# Patient Record
Sex: MALE | Race: WHITE | NOT HISPANIC OR LATINO | Employment: STUDENT | URBAN - METROPOLITAN AREA
[De-identification: names, ages, dates, MRNs, and addresses within clinical notes are randomized per-mention and may not be internally consistent; named-entity substitution may affect disease eponyms.]

---
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
## 2022-02-11 VITALS
WEIGHT: 165.4 LBS | BODY MASS INDEX: 23.15 KG/M2 | TEMPERATURE: 97.5 F | SYSTOLIC BLOOD PRESSURE: 116 MMHG | SYSTOLIC BLOOD PRESSURE: 108 MMHG | TEMPERATURE: 98.6 F | DIASTOLIC BLOOD PRESSURE: 64 MMHG | SYSTOLIC BLOOD PRESSURE: 114 MMHG | HEART RATE: 80 BPM | HEART RATE: 68 BPM | OXYGEN SATURATION: 98 % | DIASTOLIC BLOOD PRESSURE: 70 MMHG | HEIGHT: 71 IN | TEMPERATURE: 98 F | SYSTOLIC BLOOD PRESSURE: 112 MMHG | WEIGHT: 170 LBS | DIASTOLIC BLOOD PRESSURE: 62 MMHG | HEART RATE: 72 BPM | DIASTOLIC BLOOD PRESSURE: 62 MMHG | OXYGEN SATURATION: 98 % | HEART RATE: 66 BPM | WEIGHT: 171.2 LBS | WEIGHT: 171.2 LBS

## 2022-02-11 VITALS
DIASTOLIC BLOOD PRESSURE: 70 MMHG | TEMPERATURE: 97.5 F | TEMPERATURE: 97.5 F | WEIGHT: 171.8 LBS | HEART RATE: 68 BPM | DIASTOLIC BLOOD PRESSURE: 74 MMHG | WEIGHT: 173.6 LBS | SYSTOLIC BLOOD PRESSURE: 122 MMHG | SYSTOLIC BLOOD PRESSURE: 114 MMHG | HEART RATE: 68 BPM

## 2022-02-11 VITALS
HEIGHT: 74 IN | WEIGHT: 190 LBS | SYSTOLIC BLOOD PRESSURE: 118 MMHG | HEART RATE: 70 BPM | DIASTOLIC BLOOD PRESSURE: 62 MMHG | BODY MASS INDEX: 24.38 KG/M2

## 2022-02-11 VITALS
SYSTOLIC BLOOD PRESSURE: 118 MMHG | HEIGHT: 71 IN | DIASTOLIC BLOOD PRESSURE: 72 MMHG | WEIGHT: 165.8 LBS | HEART RATE: 60 BPM | BODY MASS INDEX: 23.21 KG/M2 | TEMPERATURE: 98.4 F

## 2022-02-11 VITALS
BODY MASS INDEX: 24.9 KG/M2 | WEIGHT: 194 LBS | SYSTOLIC BLOOD PRESSURE: 118 MMHG | TEMPERATURE: 97.6 F | HEIGHT: 74 IN | DIASTOLIC BLOOD PRESSURE: 74 MMHG | HEART RATE: 80 BPM

## 2022-02-15 NOTE — TELEPHONE ENCOUNTER
---------------------  From: Richard Toledo LPN ([employer] Message Pool (32224_[facility]))   To: MAKI MCCULLOUGH    Sent: 10/8/2021 8:29:19 AM CDT  Subject: RE: Consumer message: FW: UWRF Physical     Good morning Maki, I learned this morning that it was sent through the mail on the 5th.  I would imagine it will be to you by Monday.        ---------------------  From: MAKI MCCULLOUGH  To: [employer] Message Pool (32224_[facility])  Sent: 10/07/2021 03:18 p.m. CDT  Subject: RE: Consumer message: FW: UWRF Physical  Has it already been mailed out? Otherwise I can pick it up tomorrow.  ---------------------  From: Richard Toledo LPN ([employer] Message Pool (32224_[facility]))  To: MAKI MCCULLOUGH  Sent: 10/7/2021 3:01:16 PM CDT  Subject: RE: Consumer message: FW: UWRF Physical       Tamar Guevara, I honestly do not know the local mail turn around times, but I do know that   is the fastest (if you live locally) and most secure way to get your form.  No chance it will get lost in the mail.  If you need it sooner than later that is better option.  Please respond with what you prefer.            ---------------------  From: MAKI MCCULLOUGH  To: [employer] Message Pool (32224_[facility])  Sent: 10/07/2021 01:34 p.m. CDT  Subject: RE: Consumer message: FW: UWRF Physical  When can I expect that to arrive so you think?       Addendum by Rozina Roldan on October 05, 2021 10:45:47 AM CDT  ---------------------  From: Rozina Roldan ([employer] Message Pool (32224_[facility]))  To: MAKI MCCULLOUGH  Sent: 10/5/2021 10:45:47 AM CDT  Subject: RE: Consumer message: FW: [facility] Physical       Hi Maki,       I have the [facility] sports exam form filled out and signed by Dr. Robles. Would you like me to mail it to you or have it at the  for ?       Thanks,       Rozina TACOS       Addendum by Travis JESUS [place] on October 05, 2021 7:26:22 AM CDT  ---------------------  From: Travis JESUS,  Minesh  To: T Message Pool ([id number]);  Sent: 10/5/2021 7:26:22 AM CDT  Subject: RE: Consumer message: FW: UWRF Physical       OK with me.  Please fill out and I will sign.  ---------------------  From: Rozina Roldan ([facility] Message Pool ([address] [place] [address]))  To: Minesh Robles MD;  Sent: 10/5/2021 7:03:58 AM CDT  Subject: FW: Consumer message: FW: UWRF Physical       If okay to fill out again, form is attached in original message in chart.            ---------------------  From: MAKI MCCULLOUGH  To: [facility] Message Pool ([facility])  Sent: 09/29/2021 12:55 p.m. CDT  Subject: RE: Consumer message: FW: UWRF Physical  Yes. That should be fine. Should I reach out again or will you keep this request?       Addendum by Mireya Balbuena CMA on September 29, 2021 9:52:06 AM CDT  ---------------------  From: Mireya Balbuena CMA ([facility] Message Pool ([address] [place] [address]))  To: Che Springer RN; MAKI MCCULLOUGH  Sent: 9/29/2021 9:52:06 AM CDT  Subject: RE: Consumer message: FW: UWRF Physical       Dr Travis Elena is out of clinic until Tuesday October 5th. Can you wait until that time for your paperwork to be addressed?       Mireya SIERRA CMA  ---------------------  From: Che Springer RN (Phone Messages Pool ([address] [place] [address]))  To: T Message Pool ([address] [place] [address]);  Sent: 9/29/2021 7:59:59 AM CDT  Subject: Consumer message: FW: UWRF Physical                      ---------------------  From: MAKI MCCULLOUGH  To: [facility]  Sent: 09/28/2021 08:10 p.m. CDT  Subject: [facility] Physical  Hello,  Would you please fill out this form again for m?  You had done one already for the [facility].  I did a pivot and am now at [facility].  Please find the form attached.  Thank you so much,  Maki

## 2022-02-15 NOTE — PROGRESS NOTES
Patient:   MAKI MCCULLOUGH            MRN: [id number]            FIN: [id number]               Age:   18 years     Sex:  Male     :  2000   Associated Diagnoses:   Left ankle sprain   Author:   Isma Gonzalez PA-C      Report Summary   Diagnosis  Left ankle sprain (WXA49-IB S93.402A).  Patient Instructions   Visit Information   Visit type:  General concerns.    Accompanied by:  Father.    Source of history:  Self, Father, Medical record.    History limitation:  None.       Chief Complaint   2019 8:54 AM CDT    Left ankle injury; hurt last night during basketball game      History of Present Illness             The patient presents with pain and injury.  The symptom(s) is described as pain, swelling, a limited range of motion and no deformity.  The location of symptom(s) is the left and ankle.  The severity of the symptom(s) is moderate.  The timing/course of symptom(s) is constant.  The symptom(s) has lasted for 1 day(s).  Radiation of pain: none.  The context of the symptom(s): occurred during sports.  Inversion injury last night to left ankle..        Review of Systems   Constitutional:  Negative.    Musculoskeletal:  Negative except as documented in history of present illness.    Integumentary:  Negative.    Neurologic:  Negative.       Health Status   Allergies:    Allergic Reactions (All)  No Known Medication Allergies  Canceled/Inactive Reactions (All)  No known allergies      Histories   Past Medical History:    No active or resolved past medical history items have been selected or recorded.   Family History:    No family history items have been selected or recorded.   Procedure history:    Tonsillectomy with adenoidectomy (89604596) in  at 9 Years.   Social History:             No active social history items have been recorded.      Physical Examination   Vital Signs   2019 8:54 AM CDT Peripheral Pulse Rate 70 bpm    HR Method Manual    Systolic Blood Pressure 118 mmHg    Diastolic  Blood Pressure 62 mmHg    Mean Arterial Pressure 81 mmHg    BP Method Manual      Measurements from flowsheet : Measurements   7/17/2019 8:54 AM CDT Height Measured - Standard 73.5 in    Weight Measured - Standard 190.0 lb    BSA 2.11 m2    Body Mass Index 24.72 kg/m2    Body Mass Index Percentile 84.78      General:  Alert and oriented, No acute distress.    Cardiovascular:          Arterial pulses: Left, Dorsalis pedis, 2+.         Capillary refill: Left, Lower extremity, Within normal limits.    Musculoskeletal:  Normal range of motion, No deformity, Mild soft tissue swelling noted diffusely about the ankle. .    Integumentary:  No rash.    Neurologic:  No focal deficits.       Review / Management   Radiology results   Appears normal to my read, waiting for official read.  Will contact patient with any other findings.      Impression and Plan   Diagnosis     Left ankle sprain (YQI99-UJ S93.402A).     Patient Instructions:       Counseled: Patient, Family, Regarding diagnosis, Regarding medications, Activity, Verbalized understanding.    Elevate, ACE. NSAIDS. Ice. FU in 7-10 days if not improved.

## 2022-02-15 NOTE — NURSING NOTE
Comprehensive Intake Entered On:  2021 8:49 AM CDT    Performed On:  2021 8:44 AM CDT by Alyssa Sawyer LPN               Summary   Chief Complaint :   physical for collage sports.    Menstrual Status :   N/A   Weight Measured :   194 lb(Converted to: 194 lb 0 oz, 87.997 kg)    Height Measured :   73.5 in(Converted to: 6 ft 1 in, 186.69 cm)    Body Mass Index :   25.25 kg/m2   Body Surface Area :   2.13 m2   Systolic Blood Pressure :   118 mmHg   Diastolic Blood Pressure :   74 mmHg   Mean Arterial Pressure :   89 mmHg   Peripheral Pulse Rate :   80 bpm   BP Site :   Right arm   Pulse Site :   Radial artery   BP Method :   Manual   HR Method :   Manual   Temperature Tympanic :   97.6 DegF(Converted to: 36.4 DegC)  (LOW)    Alyssa Sawyer LPN - 2021 8:44 AM CDT   Demographics   Last Name :   Jordon   Address :   [address]   First Name :   Ismael   Davon Initial :   HOLA POSADAS   Responsible Party Date of Birth () :   2000 CDT   City :   [place]   State :   [place]   Zip Code :   [id number]   Alyssa Sawyer LPN - 2021 8:44 AM CDT   Consents   Consent for Immunization Exchange :   Consent Granted   Consent for Immunizations to Providers :   Consent Granted   Alyssa Sawyer LPN - 2021 8:44 AM CDT   Meds / Allergies   (As Of: 2021 8:49:41 AM CDT)   Allergies (Active)   No Known Medication Allergies  Estimated Onset Date:   Unspecified ; Created By:   Melanie Angulo CMA; Reaction Status:   Active ; Category:   Drug ; Substance:   No Known Medication Allergies ; Type:   Allergy ; Updated By:   Melanie Angulo CMA; Reviewed Date:   2021 8:47 AM CDT        Medication List   (As Of: 2021 8:49:41 AM CDT)        Depression Screening   Little Interest - Pleasure in Activities :   Not at all   Feeling Down, Depressed, Hopeless :   Not at all   Initial Depression Screen Score :   0 Score   Poor Appetite or Overeating :   Not at all   Trouble Falling or Staying Asleep :   Not at  all   Feeling Tired or Little Energy :   Not at all   Feeling Bad About Yourself :   Not at all   Trouble Concentrating :   Not at all   Moving or Speaking Slowly :   Not at all   Thoughts Better Off Dead or Hurting Self :   Not at all   Difficulty at Work, Home, Getting Along :   Not difficult at all   Detailed Depression Screen Score :   0    Total Depression Screen Score :   0    Alyssa Sawyer LPN - 6/29/2021 8:44 AM CDT   Past Medical History   Past Medical History   (As Of: 6/29/2021 8:49:41 AM CDT)     Social History   Social History   (As Of: 6/29/2021 8:49:41 AM CDT)   Tobacco:        Never (less than 100 in lifetime)   (Last Updated: 6/29/2021 8:47:11 AM CDT by Alyssa Sawyer LPN)          Electronic Cigarette/Vaping:        Electronic Cigarette Use: Never.   (Last Updated: 6/29/2021 8:47:15 AM CDT by Alyssa Sawyer LPN)

## 2022-02-15 NOTE — TELEPHONE ENCOUNTER
---------------------From: Nguyen Zimmerman RN (Phone Messages Pool (78224_[facility])) To: [facility] Message Pool ([id number]);   Sent: 7/2/2021 8:29:41 AM CDTSubject: FW: College Physical Form ---------------------From: MAKI MCCULLOUGHTo: [facility] CenterSent: 07/02/2021 08:28 a.m. CDTSubject: College Physical FormHi Dr. Robles,Attached is the form that I need you to fill out for me.Thank you,Maki McculloughForm printed an on your desk. Pt seen for sports px 6/29/2021.---------------------From: Susan Ivey ([employer] Message Pool (32224_[facility])) To: Minesh Robles MD;   Sent: 7/2/2021 10:12:35 AM CDTSubject: FW: College Physical Form---------------------From: Minesh Robles MD To: [facility] Message Pool (22024_[facility]);   Sent: 7/2/2021 10:23:06 AM CDTSubject: RE: College Physical Form Done.Left message to call back. Please tell him form is completed. Does he want to pick it up or does he want it mailed to him?PT CALLED BACK AND WE CAN MAIL IT TO THE ADDRESS ON FILE.Mailed.

## 2022-02-15 NOTE — PROGRESS NOTES
Patient:   MAKI MCCULLOUGH            MRN: [id number]            FIN: [id number]               Age:   16 years     Sex:  Male     :  2000   Associated Diagnoses:   Fracture of distal fibula   Author:   Minesh Robles MD      Chief Complaint   2017 2:54 PM CDT     f/u ankle fracture     Chief complaint and symptoms noted above confirmed with patient.      History of Present Illness             The patient presents with ankle fracture.  The location of the ankle fracture is the right.  The ankle fracture is characterized by pain and swelling.  The severity of the injury is moderate.  The timing/course of symptoms associated to the ankle fracture is constant.  The ankle fracture occurred 5 week(s) ago.  Additional injury (injuries): basketball.  Associated symptoms consist of none.  Prior treatment consists of immobilization.  Reviewed Xrays.        Review of Systems   Constitutional:  Negative.    Musculoskeletal:  Negative except as documented in history of present illness.       Health Status   Allergies:    Allergic Reactions (Selected)  No known allergies      Histories   Past Medical History:    No active or resolved past medical history items have been selected or recorded.   Family History:    No family history items have been selected or recorded.   Procedure history:    Tonsillectomy with adenoidectomy (SNOMED CT 25347548) in  at 9 Years.      Physical Examination   Vital Signs   2017 2:54 PM CDT Temperature Tympanic 97.5 DegF  LOW    Peripheral Pulse Rate 68 bpm    Pulse Site Radial artery    HR Method Manual    Systolic Blood Pressure 114 mmHg    Diastolic Blood Pressure 70 mmHg    Mean Arterial Pressure 85 mmHg    BP Site Left arm    BP Method Manual      Measurements from flowsheet : Measurements   2017 2:54 PM CDT     Weight Measured - Standard                171.8 lb     General:  Alert and oriented, No acute distress.    Musculoskeletal:       Lower extremity exam: Ankle  ( Right, No tenderness, Normal range of motion ).    Integumentary:  Warm, Dry, Pink.       Review / Management   Radiology results   X-ray, Resolving fracture distal fibula      Impression and Plan   Diagnosis     Fracture of distal fibula (SCD23-NE S82.839A).     Course:  Progressing as expected.    Orders     Will transition to stirrup splint.  Can work out but no practice or games for 2 weeks..

## 2022-02-15 NOTE — PROGRESS NOTES
Patient:   MAKI MCCULLOUGH            MRN: [id number]            FIN: [id number]               Age:   16 years     Sex:  Male     :  2000   Associated Diagnoses:   Fracture of distal fibula   Author:   Minesh Robles MD      Chief Complaint   2017 12:55 PM CDT    R ankle pain post injury 17.  Swelling and bruised     Chief complaint and symptoms noted above confirmed with patient.      History of Present Illness             The patient presents with ankle fracture.  The location of the ankle fracture is the right.  The ankle fracture is characterized by pain and swelling.  The severity of the injury is moderate.  The timing/course of symptoms associated to the ankle fracture is constant.  Additional injury (injuries): basketball.  Prior treatment consists of none.        Review of Systems   Constitutional:  Negative.    Musculoskeletal:  Negative except as documented in history of present illness.       Health Status   Allergies:    Allergic Reactions (Selected)  No known allergies      Histories   Past Medical History:    No active or resolved past medical history items have been selected or recorded.   Family History:    No family history items have been selected or recorded.   Procedure history:    Tonsillectomy with adenoidectomy (SNOMED CT 03489719) in  at 9 Years.      Physical Examination   Vital Signs   2017 12:55 PM CDT Temperature Tympanic 98.6 DegF    Peripheral Pulse Rate 80 bpm    Pulse Site Radial artery    HR Method Manual    Systolic Blood Pressure 112 mmHg    Diastolic Blood Pressure 62 mmHg    Mean Arterial Pressure 79 mmHg    BP Site Left arm    BP Method Manual      Measurements from flowsheet : Measurements   2017 12:55 PM CDT    Weight Measured - Standard                170 lb     General:  Alert and oriented, No acute distress.    Musculoskeletal:       Lower extremity exam: Ankle ( Right, Ecchymosis, Swelling ).    Integumentary:  Warm, Dry, Pink.        Review / Management   Radiology results   X-ray, ? fx distal fibula      Impression and Plan   Diagnosis     Fracture of distal fibula (CEW04-JC S82.839A).     Course:  Progressing as expected.    Orders     Cam walker x 4-6 weeks.  F/U if not improving..

## 2022-02-15 NOTE — TELEPHONE ENCOUNTER
---------------------From: Martha Michel LPN (Phone Messages Pool ([id number])) To: [facility] Message Pool ([address] [place] [address]);   Sent: 7/9/2021 2:03:30 PM CDTSubject: CONSUMER MESSAGE FW: Can'tWait Physical ---------------------From: MAKI MCCULLOUGHTo: [facility] CenterSent: 07/09/2021 01:51 p.m. CDTSubject: [place] PhysicalHelcarol ann,I was in the other day and needed  to fill out a form for college for me.  He did and I received it back, but I need some additional labs for finding...I need:Urine - specific gravityProteinGlucoseVision Right and LeftPlease see under the Physician Examinationand advise how to take care of this.  Thank you, Maki---------------------From: Mireya Balbuena CMA ([employer] Message Pool (32224[facility])) To: Minesh Robles MD;   Sent: 7/9/2021 2:12:22 PM CDTSubject: FW: CONSUMER MESSAGE FW: [place] Physical Advise on lab orders. Will have him come in for nurse only vision exam.---------------------From: Minesh Robles MD To: [employer] Message Pool (26624[facility]); MAKI MCCULLOUGH  Sent: 7/9/2021 3:16:25 PM CDTSubject: RE: CONSUMER MESSAGE FW: [place] Physical Jonnathan Guevara, you can make a nurse only visit and have them get a U/A and visual acuity.  I can easily add them to your form for school as they can all be done same day.---------------------From: Minesh Robles MD To: [employer] Message Pool (40024[facility]);   Sent: 7/9/2021 3:17:32 PM CDTSubject: RE: CONSUMER MESSAGE FW: College Physical I think thee should have been done at his physical and I would like to see that he doesn't incur another visit charge but only for the U/A minor.[facility]---------------------From: Mireya Balbuena CMA (T Message Pool (32224_[facility])) To: Unit 2 Pool (32224_[facility]) ;   Sent: 7/9/2021 4:46:16 PM CDTSubject: FW: CONSUMER MESSAGE FW: College Physical FYI. Form will need to be obtained during nurse only visit and given to [facility] for addendum. Can mail to  patient when completed.---------------------From: Jimena Khan CMA (Unit 2 Pool (32224_[facility]) ) To: [facility] Message Pool (32224_[facility]);   Sent: 7/22/2021 2:05:29 PM CDTSubject: FW: CONSUMER MESSAGE FW: College Physical Pt in for appt..... eye exam documented on form... form given to Es ZAPIEN for UA results... pt states form can be mailed to him when finished and that he will need by the Aug 1....Form in [facility] CSS file cabinet. Will await on UA results.UA results back and documented on form. Copy made for scanning and original ppwk mailed to patient per his request.

## 2022-02-15 NOTE — TELEPHONE ENCOUNTER
---------------------  From: Rozina Roldan (T Message Pool (32224_[facility]))   To: Travis JESUS [place];     Sent: 10/5/2021 7:03:58 AM CDT  Subject: FW: Consumer message: FW: [facility] Physical     If okay to fill out again, form is attached in original message in chart.       ---------------------  From: MAKI MCCULLOUGH  To: T Message Pool (32224[facility])  Sent: 09/29/2021 12:55 p.m. CDT  Subject: RE: Consumer message: FW: UWRF Physical  Yes. That should be fine. Should I reach out again or will you keep this request?       Addendum by Mireya Balbuena CMA on September 29, 2021 9:52:06 AM CDT  ---------------------  From: Mireya Balbuena CMA (T Message Pool (32224[facility]))  To: Che Springer RN; MAKI MCCULLOUGH  Sent: 9/29/2021 9:52:06 AM CDT  Subject: RE: Consumer message: FW: [facility] Physical       Hello Dr Travis Guevara is out of clinic until Tuesday October 5th. Can you wait until that time for your paperwork to be addressed?       Mireya SIERRA CMA  ---------------------  From: Che Springer RN (Phone Messages Pool (32224[facility]))  To: T Message Pool (32224_[facility]);  Sent: 9/29/2021 7:59:59 AM CDT  Subject: Consumer message: FW: UWRF Physical                      ---------------------  From: MAKI MCCULLOUGH  To: [facility]  Sent: 09/28/2021 08:10 p.m. CDT  Subject: [facility] Physical  Hello,  Would you please fill out this form again for m?  You had done one already for the [facility].  I did a pivot and am now at [facility].  Please find the form attached.  Thank you so much,  Maki---------------------  From: Travis JESUS [place]   To: [employer] Message Pool (32224_[facility]);     Sent: 10/5/2021 7:26:22 AM CDT  Subject: RE: Consumer message: FW: [facility] Physical     OK with me.  Please fill out and I will sign.---------------------  From: Rozina Roldan ([employer] Message Pool (72624_[facility]))   To: MAKI MCCULLOUGH    Sent:  10/5/2021 10:45:47 AM CDT  Subject: RE: Consumer message: FW: [facility] Physical     Hi Ismael,     I have the [facility] sports exam form filled out and signed by Dr. Robles. Would you like me to mail it to you or have it at the  for ?    Thanks,    ЮЛИЯ Handy

## 2022-02-15 NOTE — TELEPHONE ENCOUNTER
---------------------  From: Mireya Balbuena CMA (T Message Pool (32224_[facility]))   To: MAKI MCCULLOUGH    Sent: 9/29/2021 1:28:42 PM CDT  Subject: RE: Consumer message: FW: UWRF Physical     I will hold onto this request/message until he returns.      ---------------------  From: MAKI MCCULLOUGH  To: T Message Pool (32224_[facility])  Sent: 09/29/2021 12:55 p.m. CDT  Subject: RE: Consumer message: FW: UWRF Physical  Yes. That should be fine. Should I reach out again or will you keep this request?       Addendum by Mireya Balbuena CMA on September 29, 2021 9:52:06 AM CDT  ---------------------  From: Mireya Balbuena CMA (T Message Pool (32224_[facility]))  To: Che Springer RN; MAKI MCCULLOUGH  Sent: 9/29/2021 9:52:06 AM CDT  Subject: RE: Consumer message: FW: UWRF Physical       Hello Dr Travis Guevara is out of clinic until Tuesday October 5th. Can you wait until that time for your paperwork to be addressed?       Mireya SIERRA CMA  ---------------------  From: Che Springer RN (Phone Messages Pool (32224_[facility]))  To: T Message Pool (32224_[facility]);  Sent: 9/29/2021 7:59:59 AM CDT  Subject: Consumer message: FW: UWRF Physical                      ---------------------  From: MAKI MCCULLOUGH  To: [facility]  Sent: 09/28/2021 08:10 p.m. CDT  Subject: UWRF Physical  Hello,  Would you please fill out this form again for m?  You had done one already for the [facility].  I did a pivot and am now at [employer] [facility].  Please find the form attached.  Thank you so much,  Maki

## 2022-02-15 NOTE — TELEPHONE ENCOUNTER
---------------------  From: Marilu SUAZO, Martha PEREZ (Phone Messages Pool ([id number]_[facility]))   To: MAKI MCCULLOUGH    Sent: 8/3/2021 8:00:21 AM CDT  Subject: FW: Sickle Cell Test     Jonnathan Guevara,    You would need to get a copy of your  screening to see if that was something that was tested. You can contact the records department at the hospital that you were born at and they can help you get these records.    Thanks,  Martha SMITH LPN        ---------------------  From: MAKI MCCULLOUGH  To: [facility]  Sent: 2021 06:19 p.m. CDT  Subject: Sickle Cell Test  Can I find out if I have a sickle cell test from when I was younger? I need to add it to my physical for college. Thank you.

## 2022-02-15 NOTE — PROGRESS NOTES
Patient:   MAKI MCCULLOUGH            MRN: [id number]            FIN: [id number]               Age:   20 years     Sex:  Male     :  2000   Associated Diagnoses:   Well adult   Author:   Minesh Robles MD      Visit Information      Date of Service: 2021 07:52 am  Performing Location: [facility]  Encounter#: [id number]      Primary Care Provider (PCP):  Minesh Robles MD    NPI# [id number]      Referring Provider:  Minesh Robles MD    NPI# [id number]      Chief Complaint   2021 8:44 AM CDT    physical for collage sports.     Chief complaint and symptoms noted above confirmed with patient.      Well Adult History   Well Adult History             The patient presents for well adult exam.  The patient's general health status is described as good.  The patient's diet is described as balanced.  Exercise: routine.  Associated symptoms consist of none.  Additional pertinent history: none.        Review of Systems   Constitutional:  Negative.    Ear/Nose/Mouth/Throat:  Negative.    Respiratory:  Negative.    Cardiovascular:  Negative.    Gastrointestinal:  Negative.    Endocrine:  Negative.    Immunologic:  Negative.    Musculoskeletal:  Negative.    Integumentary:  Negative.    Neurologic:  Negative.    Psychiatric:  Negative.       Health Status   Allergies:    Allergic Reactions (Selected)  No Known Medication Allergies   Medications:  (Selected)      Problem list:    No problem items selected or recorded.      Histories   Past Medical History:    No active or resolved past medical history items have been selected or recorded.   Family History:    No family history items have been selected or recorded.   Procedure history:    Tonsillectomy with adenoidectomy (SNOMED CT 48447059) in  at 9 Years.   Social History:        Electronic Cigarette/Vaping Assessment            Electronic Cigarette Use: Never.      Tobacco Assessment            Never (less than 100 in  lifetime)        Physical Examination   Vital Signs   6/29/2021 8:44 AM CDT Temperature Tympanic 97.6 DegF  LOW    Peripheral Pulse Rate 80 bpm    Pulse Site Radial artery    HR Method Manual    Systolic Blood Pressure 118 mmHg    Diastolic Blood Pressure 74 mmHg    Mean Arterial Pressure 89 mmHg    BP Site Right arm    BP Method Manual      Measurements from flowsheet : Measurements   6/29/2021 8:44 AM CDT Height Measured - Standard 73.5 in    Height/Length Percentile 0.00    Height/Length Z-score -13.13    Weight Measured - Standard 194 lb    Weight Percentile 100.00    Weight Z-score 3.94    BSA 2.13 m2    Body Mass Index 25.25 kg/m2    Body Mass Index Percentile 79.51    BMI Z-score 0.82      General:  Alert and oriented, No acute distress.    Eye:  Pupils are equal, round and reactive to light, Extraocular movements are intact, Normal conjunctiva.    HENT:  Normocephalic, Tympanic membranes are clear, Normal hearing.    Neck:  Supple, Non-tender, No carotid bruit.    Respiratory:  Lungs are clear to auscultation, Respirations are non-labored, Breath sounds are equal, Symmetrical chest wall expansion.    Cardiovascular:  Normal rate, Regular rhythm, No murmur.    Gastrointestinal:  Soft, Non-tender, Non-distended.    Genitourinary:  No costovertebral angle tenderness.    Lymphatics:  No lymphadenopathy neck, axilla, groin.    Musculoskeletal:  Normal range of motion, Normal strength, No tenderness.    Integumentary:  Warm, Dry, Pink.    Neurologic:  Alert, Oriented, Normal sensory, Normal motor function.    Psychiatric:  Cooperative, Appropriate mood & affect, Normal judgment, Non-suicidal.       Impression and Plan   Diagnosis     Well adult (AZL39-HC Z00.00).     Course:  Progressing as expected.    Orders     OK to enter school and participate in college sports..

## 2022-02-15 NOTE — PROGRESS NOTES
Patient:   MAKI MCCULLOUGH            MRN: [id number]            FIN: [id number]               Age:   16 years     Sex:  Male     :  2000   Associated Diagnoses:   Wart   Author:   Minesh Robles MD      Procedure   Dermatology Surgical Procedure   Date/ Time:  2017 3:38:00 PM.     Confirmed: patient, procedure, side, and site are correct, safety procedures followed.     Informed consent: signed by patient.     Performed by: Minesh Robles MD.     Indication: remove lesion.     Procedure performed: treated with Cantharidin, patient advised to wash it off in four hours sooner if becomes painful..     Complications: none.     Procedure tolerated: well.        Impression and Plan   Diagnosis     Wart (WHO74-DH B07.8).     Orders     Orders   Requests (Return to Office):  Return to Clinic (Request) (Order): Return in 2 weeks.

## 2022-02-15 NOTE — PROGRESS NOTES
Patient:   MAKI MCCULLOUGH            MRN: [id number]            FIN: [id number]               Age:   16 years     Sex:  Male     :  2000   Associated Diagnoses:   Common wart   Author:   Brandin Chery MD      Chief Complaint   2017 8:35 AM CDT    wart treatment        History of Present Illness   Lesion consistent with a wart is present.  Patient desires treatment. Last treatment 17 with kristy Lugo.  Number: one  Location: base of left them  Prior treatment: catharidin 17  Length of time wart has been present: months, had failed otc treatment.  Mom notes wart is significantly smaller but skin around wart is irritated and reddened.         Health Status   Allergies:    Allergic Reactions (Selected)  No known allergies   Medications:  (Selected)      Problem list:    No problem items selected or recorded.      Histories   Past Medical History:    No active or resolved past medical history items have been selected or recorded.   Family History:    No family history items have been selected or recorded.   Procedure history:    Tonsillectomy with adenoidectomy (43339183) in  at 9 Years.      Physical Examination   Vital Signs   2017 8:35 AM CDT Temperature Temporal 98.0 DegF    Peripheral Pulse Rate 72 bpm    Systolic Blood Pressure 108 mmHg    Diastolic Blood Pressure 64 mmHg    Mean Arterial Pressure 79 mmHg    Oxygen Saturation 98 %      Measurements from flowsheet : Measurements   2017 8:35 AM CDT Height Measured - Standard 71.25 in    Weight Measured - Standard 165.4 lb    BSA 1.94 m2    Body Mass Index 22.9 kg/m2    Body Mass Index Percentile 83.41      General:  Alert and oriented, No acute distress.    Integumentary:  8x5mm wart at base of left thumb with surrounding 1.5cm of reddened slightly raised skin.       Review / Management   Course:  Discussed options for treatment with patient and mom. Given surrounding erythema, suggested localized treatment of wart  with liquid nitrogen to avoid further irritation of skin around wart. In agreement. Patient treated with liquid nitrogen in a three freeze thaw cycle.  Tolerated well..       Impression and Plan   Diagnosis     Common wart (BLT92-RW B07.8).     Orders     Follow up if not improving over the next 2-3 weeks.

## 2022-02-15 NOTE — PROGRESS NOTES
Patient:   MAKI MCCULLOUGH            MRN: [id number]            FIN: [id number]               Age:   16 years     Sex:  Male     :  2000   Associated Diagnoses:   Warts   Author:   Isma Gonzalez PA-C      Subjective   Chief complaint 2017 1:40 PM CDT    patient here for wart treatment  .     Recheck after recent freezing.       Health Status   Allergies:    Allergic Reactions (All)  No known allergies   Medications:  (Selected)   Prescriptions  Prescribed  Aldara 5% topical cream: 1 lisa, TOP, MWF, # 1 kit(s), 0 Refill(s), Type: Maintenance, Pharmacy: [facility] #[id number], 1 lisa top mwf      Objective   Vital Signs   2017 1:40 PM CDT Peripheral Pulse Rate 66 bpm    Systolic Blood Pressure 116 mmHg    Diastolic Blood Pressure 62 mmHg    Mean Arterial Pressure 80 mmHg    Oxygen Saturation 98 %      Measurements from flowsheet : Measurements   2017 1:40 PM CDT    Weight Measured - Standard                171.2 lb     Integumentary:  Wart now with ring around it on left hand. Other warts on finger tips and right ankle..       Impression and Plan   Assessment and Plan:          Diagnosis: Warts (MKI01-DX B07.9).    Orders     Orders (Selected)   Outpatient Orders  Ordered  Referral (Request): 17 14:01:00 CDT, Referred to: Dermatology, Referred to: [facility], Reason for referral: warts, Warts  Prescriptions  Prescribed  Aldara 5% topical cream: 1 lisa, TOP, MWF, # 1 kit(s), 0 Refill(s), Type: Maintenance, Pharmacy: [facility] #[id number], 1 lisa top mwf.     .

## 2022-02-15 NOTE — TELEPHONE ENCOUNTER
---------------------  From: Travis JESUS [place]   To: MAKI MCCULLOUGH    Sent: 7/23/2021 10:00:14 AM CDT  Subject: General Message     These are acceptable, please keep scheduled follow up appointments.    Richard Robles MD      Results:  Date Result Name Value Ref Range   7/22/2021 1:56 PM UA Color YELLOW (YELLOW - )   7/22/2021 1:56 PM UA Appear CLEAR (CLEAR - )   7/22/2021 1:56 PM UA pH 7.5 (5.0 - 8.0)   7/22/2021 1:56 PM UA Specific Gravity 1.007 (1.001 - 1.035)   7/22/2021 1:56 PM UA Glucose NEGATIVE (NEGATIVE - NEGATIVE)   7/22/2021 1:56 PM UA Bilirubin NEGATIVE (NEGATIVE - NEGATIVE)   7/22/2021 1:56 PM UA Ketones NEGATIVE (NEGATIVE - NEGATIVE)   7/22/2021 1:56 PM UA Blood NEGATIVE (NEGATIVE - NEGATIVE)   7/22/2021 1:56 PM UA Protein NEGATIVE (NEGATIVE - NEGATIVE)   7/22/2021 1:56 PM UA Nitrite NEGATIVE (NEGATIVE - NEGATIVE)   7/22/2021 1:56 PM UA Leukocyte Esterase NEGATIVE (NEGATIVE - NEGATIVE)   7/22/2021 1:56 PM UA Squamous Epithelial Cells NONE SEEN /HPF ( - < OR = 5)   7/22/2021 1:56 PM UA Hyaline Cast NONE SEEN /LPF (NONE SEEN - )   7/22/2021 1:56 PM UA WBC NONE SEEN /HPF ( - < OR = 5)   7/22/2021 1:56 PM UA RBC NONE SEEN /HPF ( - < OR = 2)   7/22/2021 1:56 PM UA Bacteria NONE SEEN /HPF (NONE SEEN - )

## 2022-02-15 NOTE — PROGRESS NOTES
Patient:   MAKI MCCULLOUGH            MRN: [id number]            FIN: [id number]               Age:   16 years     Sex:  Male     :  2000   Associated Diagnoses:   None   Author:   Minesh Robles MD       -   Today's date:  2017 1:26:00 PM .        -   To whom it may concern:        This patient is currently under my care and Was seen in my office on  2017.  .  He/ she may return to school, with the following restrictions: no physical education, May work out above the waist for the next 4 weeks..  The patient will need follow-up care in  2  weeks.  Please contact me if you have any questions or concerns.      -   Sincerely,             Minesh Robles MD  [address]  [address] [phone]

## 2022-02-15 NOTE — TELEPHONE ENCOUNTER
---------------------From: Che Springer RN (Phone Messages Pool ([id number]_[facility])) To: [facility] Message Pool ([id number]_[facility]);   Sent: 9/29/2021 7:59:59 AM CDTSubject: Consumer message: FW: [facility] Physical ---------------------From: MAKI MCCULLOUGHTo: [facility]Sent: 09/28/2021 08:10 p.m. CDTSubject: [facility] PhysicalHello,Would you please fill out this form again for m?  You had done one already for the [facility].  I did a pivot and am now at [facility].  Please find the form attached.Thank you so much,Maki---------------------From: Mireya Balbuena CMA ([facility] Message Pool (32224_[facility])) To: Che Springer RN; MAKI MCCULLOUGH  Sent: 9/29/2021 9:52:06 AM CDTSubject: RE: Consumer message: FW: [facility] Physical Tamar Dr Travis Guevara is out of clinic until Tuesday October 5th. Can you wait until that time for your paperwork to be addressed?Mireya SIERRA CMA

## 2022-02-15 NOTE — TELEPHONE ENCOUNTER
---------------------  From: Richard Toledo LPN (T Message Pool ([id number]))   To: MAKI MCCULLOUGH    Sent: 10/7/2021 3:01:16 PM CDT  Subject: RE: Consumer message: FW: [facility] Physical     Tamar Guevara, I honestly do not know the local mail turn around times, but I do know that   is the fastest (if you live locally) and most secure way to get your form.  No chance it will get lost in the mail.  If you need it sooner than later that is better option.  Please respond with what you prefer.      ---------------------  From: MAKI MCCULLOUGH  To: [facility] Message Pool ([address] [place] [address])  Sent: 10/07/2021 01:34 p.m. CDT  Subject: RE: Consumer message: FW: [facility] Physical  When can I expect that to arrive so you think?       Addendum by Rozina Roldan on October 05, 2021 10:45:47 AM CDT  ---------------------  From: Rozina Roldan ([facility] Message Pool ([facility]24[facility]))  To: MAKI MCCULLOUGH  Sent: 10/5/2021 10:45:47 AM CDT  Subject: RE: Consumer message: FW: [facility] Physical       Jonnathan Guevara,       I have the [facility] sports exam form filled out and signed by Dr. Robles. Would you like me to mail it to you or have it at the  for ?       Rozina Carroll RMA       Addendum by Minesh Robles MD on October 05, 2021 7:26:22 AM CDT  ---------------------  From: Minesh Robles MD  To: [facility] Message Pool ([id number]);  Sent: 10/5/2021 7:26:22 AM CDT  Subject: RE: Consumer message: FW: [facility] Physical       OK with me.  Please fill out and I will sign.  ---------------------  From: Rozina Roldan ([employer] Message Pool (32224_[facility]))  To: Travis JESUS [place];  Sent: 10/5/2021 7:03:58 AM CDT  Subject: FW: Consumer message: FW: [facility] Physical       If okay to fill out again, form is attached in original message in chart.            ---------------------  From: MAKI MCCULLOUGH  To: [employer] Message Pool (32224_[facility])  Sent: 09/29/2021 12:55  p.m. CDT  Subject: RE: Consumer message: FW: [facility] Physical  Yes. That should be fine. Should I reach out again or will you keep this request?       Addendum by Mireya Balbuena CMA on September 29, 2021 9:52:06 AM CDT  ---------------------  From: Mireay Balbuena CMA (T Message Pool (13424_[facility]))  To: Che Springer RN; MAKI MCCULLOUGH  Sent: 9/29/2021 9:52:06 AM CDT  Subject: RE: Consumer message: FW: [facility] Physical       Hello Dr Travis Guevara is out of clinic until Tuesday October 5th. Can you wait until that time for your paperwork to be addressed?       Mireya SIERRA CMA  ---------------------  From: Che Springer RN (Phone Messages Pool (31324_[facility]))  To: T Message Pool (61724_[facility]);  Sent: 9/29/2021 7:59:59 AM CDT  Subject: Consumer message: FW: [facility] Physical                      ---------------------  From: MAKI MCCULLOUGH  To: [facility]  Sent: 09/28/2021 08:10 p.m. CDT  Subject: [facility] Physical  Hello,  Would you please fill out this form again for m?  You had done one already for the [facility].  I did a pivot and am now at [facility].  Please find the form attached.  Thank you so much,  Maki

## 2022-02-15 NOTE — NURSING NOTE
Hearing and Vision Screening Entered On:  7/22/2021 2:03 PM CDT    Performed On:  7/22/2021 2:00 PM CDT by Jimena Khan CMA               Hearing and Vision Screening   Visual Acuity Evaluated Left Eye :   20/15   Visual Acuity Evaluated Right Eye :   20/15   Vision Test Type :   Snellen   Vision Screen Comments :   no glasses or contacts ..both eyes 20/13   Jimena Khan CMA - 7/22/2021 2:00 PM CDT

## 2022-02-15 NOTE — PROGRESS NOTES
Patient:   MAKI MCCULLOUGH            MRN: [id number]            FIN: [id number]               Age:   16 years     Sex:  Male     :  2000   Associated Diagnoses:   Fracture of distal fibula   Author:   Minesh Robles MD      Chief Complaint   2017 8:18 AM CDT    f/u R ankle fracture - improved     Chief complaint and symptoms noted above confirmed with patient.      History of Present Illness             The patient presents with ankle fracture.  The location of the ankle fracture is the right.  The ankle fracture is characterized by pain and swelling.  The severity of the injury is moderate.  The timing/course of symptoms associated to the ankle fracture is constant.  Additional injury (injuries): basketball.  Prior treatment consists of none.  Reviewed Xrays.        Review of Systems   Constitutional:  Negative.    Musculoskeletal:  Negative except as documented in history of present illness.       Health Status   Allergies:    Allergic Reactions (Selected)  No known allergies      Histories   Past Medical History:    No active or resolved past medical history items have been selected or recorded.   Family History:    No family history items have been selected or recorded.   Procedure history:    Tonsillectomy with adenoidectomy (SNOMED CT 83565029) in  at 9 Years.      Physical Examination   Vital Signs   2017 8:18 AM CDT Temperature Tympanic 97.5 DegF  LOW    Peripheral Pulse Rate 68 bpm    Pulse Site Radial artery    HR Method Manual    Systolic Blood Pressure 114 mmHg    Diastolic Blood Pressure 70 mmHg    Mean Arterial Pressure 85 mmHg    BP Site Right arm    BP Method Manual      Measurements from flowsheet : Measurements   2017 8:18 AM CDT    Weight Measured - Standard                171.2 lb     General:  Alert and oriented, No acute distress.    Musculoskeletal:       Lower extremity exam: Ankle ( Right, Less tender full ROM ).    Integumentary:  Warm, Dry, Pink.        Review / Management   Radiology results   X-ray, Resolving fracture distal fibula      Impression and Plan   Diagnosis     Fracture of distal fibula (BCN88-TC S82.839A).     Course:  Progressing as expected.    Orders     Cam walker x 4-6 weeks.  F/U if not improving.  Return in 2 weeks for repeat x-ray.

## 2022-02-15 NOTE — TELEPHONE ENCOUNTER
Entered by Rozina Roldan on October 05, 2021 1:18:07 PM CDT  done. sent copy to HI.       ---------------------  From: MAKI MCCULLOUGH  To: [facility] Message Pool ([id number])  Sent: 10/05/2021 12:27 p.m. CDT  Subject: RE: Consumer message: FW: [facility] Physical  You can mail it! Thank you! Very much.       Addendum by Rozina Roldan on October 05, 2021 10:45:47 AM CDT  ---------------------  From: Rozina Roldan ([facility] Message Pool ([address]224[facility]))  To: MAKI MCCULLOUGH  Sent: 10/5/2021 10:45:47 AM CDT  Subject: RE: Consumer message: FW: [facility] Physical       Hi Maki,       I have the [facility] sports exam form filled out and signed by Dr. Robles. Would you like me to mail it to you or have it at the  for ?       Thanks,       ЮЛИЯ Handy       Addendum by Minesh Robles MD on October 05, 2021 7:26:22 AM CDT  ---------------------  From: Minesh Robles MD  To: [facility] Message Pool ([id number]);  Sent: 10/5/2021 7:26:22 AM CDT  Subject: RE: Consumer message: FW: [facility] Physical       OK with me.  Please fill out and I will sign.  ---------------------  From: Rozina Roldan ([facility] Message Pool ([id number]))  To: Minesh Robles MD;  Sent: 10/5/2021 7:03:58 AM CDT  Subject: FW: Consumer message: FW: [facility] Physical       If okay to fill out again, form is attached in original message in chart.            ---------------------  From: MAKI MCCULLOUGH  To: [facility] Message Pool (32224_[facility])  Sent: 09/29/2021 12:55 p.m. CDT  Subject: RE: Consumer message: FW: [facility] Physical  Yes. That should be fine. Should I reach out again or will you keep this request?       Addendum by Mireya Balbuena CMA on September 29, 2021 9:52:06 AM CDT  ---------------------  From: Mireya Balbuena CMA ([facility] Message Pool (32224_[facility]))  To: Gian DRISCOLL, Che; MAKI MCCULLOUGH  Sent: 9/29/2021 9:52:06 AM CDT  Subject: RE: Consumer message: FW: [facility] Physical       Hello  Dr Travis Guevara is out of clinic until Tuesday October 5th. Can you wait until that time for your paperwork to be addressed?       Mireya SIERRA CMA  ---------------------  From: Che Springer RN (Phone Messages Pool (62024_[facility]))  To: [facility] Message Pool (36224_[facility]);  Sent: 9/29/2021 7:59:59 AM CDT  Subject: Consumer message: FW: [facility] Physical                      ---------------------  From: MAKI MCCULLOUGH  To: [facility]  Sent: 09/28/2021 08:10 p.m. CDT  Subject: [facility] Physical  Hello,  Would you please fill out this form again for m?  You had done one already for the [facility].  I did a pivot and am now at [facility].  Please find the form attached.  Thank you so much,  Maki

## 2022-02-15 NOTE — LETTER
(Inserted Image. Unable to display)   August 19, 2019      MAKI MCCULLOUGH  [address] [place] [address] [phone]        Dear MAKI SIMENTAL,      Thank you for selecting [facility] (previously [facility]) for your healthcare needs.     Our records indicate you are due for the following services:     Well Child Exam~ It's important to see your Healthcare Provider on a regular basis to assess growth, development, life changes, safety, health risks and to update your immunizations.    Please note:  In general, most insurance companies cover preventative service exams on an annual basis. If you are unsure, please contact your insurance company.     To schedule an appointment or if you have further questions, please contact your primary clinic:   [facility]          [phone]   Critical access hospital    [phone]             [facility]         [phone]      Powered by [employer]    Sincerely,    Minesh Robles M.D.

## 2022-02-15 NOTE — NURSING NOTE
Depression Screening Entered On:  7/6/2021 3:08 PM CDT    Performed On:  6/29/2021 3:08 PM CDT by Evelyn Mayorga               Depression Screening   Little Interest - Pleasure in Activities :   Not at all   Feeling Down, Depressed, Hopeless :   Not at all   Initial Depression Screen Score :   0 Score   Poor Appetite or Overeating :   Not at all   Trouble Falling or Staying Asleep :   Not at all   Feeling Tired or Little Energy :   Not at all   Feeling Bad About Yourself :   Not at all   Trouble Concentrating :   Not at all   Moving or Speaking Slowly :   Not at all   Thoughts Better Off Dead or Hurting Self :   Not at all   Difficulty at Work, Home, Getting Along :   Not difficult at all   Detailed Depression Screen Score :   0    Total Depression Screen Score :   0    Evelyn Mayorga - 7/6/2021 3:08 PM CDT

## 2022-02-15 NOTE — PROGRESS NOTES
Patient:   MAKI MCCULLOUGH            MRN: [id number]            FIN: [id number]               Age:   17 years     Sex:  Male     :  2000   Associated Diagnoses:   Right ankle pain; Right ankle sprain   Author:   Brandin Chery MD      Chief Complaint   10/3/2017 5:24 PM CDT    c/o R ankle pain - fractured 2 months ago, reinjured last night      History of Present Illness   Patient with right ankle pain. Had Salter II fracture of fibula 2 months ago. Had healed well. Playing football last night and was landed on by someone. Painful, mildly swollen. Better today than last night. Has been able to bear weight.      Physical Examination   Vital Signs   10/3/2017 5:24 PM CDT Temperature Tympanic 97.5 DegF  LOW    Peripheral Pulse Rate 68 bpm    Pulse Site Radial artery    HR Method Manual    Systolic Blood Pressure 122 mmHg    Diastolic Blood Pressure 74 mmHg    Mean Arterial Pressure 90 mmHg    BP Site Left arm    BP Method Manual      Measurements from flowsheet : Measurements   10/3/2017 5:24 PM CDT    Weight Measured - Standard                173.6 lb     Musculoskeletal:  lateral ankle is swollen, tender..       Review / Management   Radiology results   ankle xray looks stable, no changes, Results discussed with patient. I will contact with any additional findings once read by radiologist.      Impression and Plan   Diagnosis     Right ankle pain ([id number] M25.571).     Right ankle sprain ([id number] S93.401A).     Course:  Progressing as expected.    Plan:  Will call dad with radiologist results. Consider referral to PT for rehab..

## 2023-08-08 ENCOUNTER — OFFICE VISIT (OUTPATIENT)
Dept: FAMILY MEDICINE | Facility: CLINIC | Age: 23
End: 2023-08-08

## 2023-08-08 VITALS
HEIGHT: 75 IN | DIASTOLIC BLOOD PRESSURE: 84 MMHG | WEIGHT: 204 LBS | HEART RATE: 94 BPM | TEMPERATURE: 98.2 F | RESPIRATION RATE: 18 BRPM | BODY MASS INDEX: 25.36 KG/M2 | SYSTOLIC BLOOD PRESSURE: 124 MMHG | OXYGEN SATURATION: 97 %

## 2023-08-08 DIAGNOSIS — Z02.5 ROUTINE SPORTS EXAMINATION: ICD-10-CM

## 2023-08-08 DIAGNOSIS — Z00.00 ANNUAL PHYSICAL EXAM: Primary | ICD-10-CM

## 2023-08-08 PROCEDURE — 99395 PREV VISIT EST AGE 18-39: CPT | Performed by: PHYSICIAN ASSISTANT

## 2023-08-08 ASSESSMENT — ENCOUNTER SYMPTOMS
JOINT SWELLING: 0
DIZZINESS: 0
ARTHRALGIAS: 0
FEVER: 0
ABDOMINAL PAIN: 0
MYALGIAS: 0
HEMATURIA: 0
CONSTIPATION: 0
HEMATOCHEZIA: 0
NAUSEA: 0
DYSURIA: 0
NERVOUS/ANXIOUS: 0
WEAKNESS: 0
HEADACHES: 0
HEARTBURN: 0
SORE THROAT: 0
DIARRHEA: 0
EYE PAIN: 0
PARESTHESIAS: 0
SHORTNESS OF BREATH: 0
COUGH: 0
PALPITATIONS: 0
FREQUENCY: 0
CHILLS: 0

## 2023-08-08 NOTE — PROGRESS NOTES
SUBJECTIVE:   CC: Ismael is an 22 year old who presents for preventative health visit.       Here for [facility] [employer] sports exam he plays basketball for them  No complaints or concerns  Negative to all the special heart screening questions    Healthy Habits:     Getting at least 3 servings of Calcium per day:  Yes    Bi-annual eye exam:  Yes    Dental care twice a year:  NO    Sleep apnea or symptoms of sleep apnea:  None    Diet:  Regular (no restrictions)    Frequency of exercise:  6-7 days/week    Duration of exercise:  45-60 minutes    Taking medications regularly:  Not Applicable    Medication side effects:  Not applicable    Additional concerns today:  No      Today's PHQ-2 Score:       8/8/2023     7:17 AM   PHQ-2 ( 1999 [employer])   Q1: Little interest or pleasure in doing things 0   Q2: Feeling down, depressed or hopeless 0   PHQ-2 Score 0   Q1: Little interest or pleasure in doing things Not at all   Q2: Feeling down, depressed or hopeless Not at all   PHQ-2 Score 0                   Have you ever done Advance Care Planning? (For example, a Health Directive, POLST, or a discussion with a medical provider or your loved ones about your wishes):     Social History     Tobacco Use    Smoking status: Never    Smokeless tobacco: Never   Substance Use Topics    Alcohol use: Not on file             8/8/2023     7:17 AM   Alcohol Use   Prescreen: >3 drinks/day or >7 drinks/week? No       Last PSA: No results found for: PSA    Reviewed orders with patient. Reviewed health maintenance and updated orders accordingly -       Reviewed and updated as needed this visit by clinical staff   Tobacco  Allergies  Meds              Reviewed and updated as needed this visit by Provider                     Review of Systems   Constitutional:  Negative for chills and fever.   HENT:  Negative for congestion, ear pain, hearing loss and sore throat.    Eyes:  Negative for pain and visual disturbance.   Respiratory:  Negative for cough  "and shortness of breath.    Cardiovascular:  Negative for chest pain, palpitations and peripheral edema.   Gastrointestinal:  Negative for abdominal pain, constipation, diarrhea, heartburn, hematochezia and nausea.   Genitourinary:  Negative for dysuria, frequency, genital sores, hematuria, impotence, penile discharge and urgency.   Musculoskeletal:  Negative for arthralgias, joint swelling and myalgias.   Skin:  Negative for rash.   Neurological:  Negative for dizziness, weakness, headaches and paresthesias.   Psychiatric/Behavioral:  Negative for mood changes. The patient is not nervous/anxious.          OBJECTIVE:   /84   Pulse 94   Temp 98.2  F (36.8  C)   Resp 18   Ht 1.892 m (6' 2.5\")   Wt 92.5 kg (204 lb)   SpO2 97%   BMI 25.84 kg/m      Physical Exam  Vitals and nursing note reviewed.   Constitutional:       Appearance: Normal appearance.   HENT:      Head: Normocephalic and atraumatic.      Right Ear: Tympanic membrane normal.      Left Ear: Tympanic membrane normal.      Nose: Nose normal. No congestion or rhinorrhea.      Mouth/Throat:      Mouth: Mucous membranes are moist.   Eyes:      Conjunctiva/sclera: Conjunctivae normal.   Cardiovascular:      Rate and Rhythm: Normal rate and regular rhythm.      Heart sounds: Normal heart sounds.   Pulmonary:      Effort: Pulmonary effort is normal.      Breath sounds: Normal breath sounds.   Abdominal:      General: Abdomen is flat. Bowel sounds are normal.      Palpations: There is no mass.      Tenderness: There is no abdominal tenderness.   Musculoskeletal:         General: Normal range of motion.      Cervical back: Normal range of motion and neck supple.      Right lower leg: No edema.      Left lower leg: No edema.      Comments: See the form for extensive musculoskeletal exam   Lymphadenopathy:      Cervical: No cervical adenopathy.   Skin:     General: Skin is warm and dry.   Neurological:      General: No focal deficit present. "   Psychiatric:         Mood and Affect: Mood normal.         Behavior: Behavior normal.         Thought Content: Thought content normal.         Judgment: Judgment normal.               ASSESSMENT/PLAN:   (Z00.00) Annual physical exam  (primary encounter diagnosis)  Comment: Turn in 1 year  Plan:     (Z02.5) Routine sports examination  Comment: GERD without restriction  Plan:           COUNSELING:           He reports that he has never smoked. He has never used smokeless tobacco.            AMANDA Nayak  [facility]

## 2024-10-05 ENCOUNTER — HEALTH MAINTENANCE LETTER (OUTPATIENT)
Age: 24
End: 2024-10-05